# Patient Record
Sex: FEMALE | Race: BLACK OR AFRICAN AMERICAN
[De-identification: names, ages, dates, MRNs, and addresses within clinical notes are randomized per-mention and may not be internally consistent; named-entity substitution may affect disease eponyms.]

---

## 2018-02-21 ENCOUNTER — HOSPITAL ENCOUNTER (EMERGENCY)
Dept: HOSPITAL 62 - ER | Age: 28
Discharge: HOME | End: 2018-02-21
Payer: SELF-PAY

## 2018-02-21 VITALS — SYSTOLIC BLOOD PRESSURE: 131 MMHG | DIASTOLIC BLOOD PRESSURE: 75 MMHG

## 2018-02-21 DIAGNOSIS — F17.200: ICD-10-CM

## 2018-02-21 DIAGNOSIS — R05: ICD-10-CM

## 2018-02-21 DIAGNOSIS — J00: Primary | ICD-10-CM

## 2018-02-21 DIAGNOSIS — H92.03: ICD-10-CM

## 2018-02-21 PROCEDURE — 99282 EMERGENCY DEPT VISIT SF MDM: CPT

## 2018-02-21 NOTE — ER DOCUMENT REPORT
ED ENT





- General


Chief Complaint: Ear Pain


Stated Complaint: EAR PAIN


Time Seen by Provider: 02/21/18 11:15


Mode of Arrival: Ambulatory


Information source: Patient


Notes: 





Patient is a 27-year-old female who presents to the ER today for 1 day of 

bilateral ear pain, worse in the left ear that feels like a pressure, runny 

nose.  Patient admits to dry cough.  She states "I just think it is my 

allergies but my kids have been sick."  Patient was told to get a doctor's note 

by her work this morning whenever she was sent home.  She denies any shortness 

of breath, wheezing, chest pain, fevers or chills.


TRAVEL OUTSIDE OF THE [place] IN LAST 30 DAYS: No





- Related Data


Allergies/Adverse Reactions: 


 





No Known Allergies Allergy (Verified 02/21/18 10:18)


 











Past Medical History





- General


Information source: Patient





- Social History


Smoking Status: Current Every Day Smoker


Chew tobacco use (# tins/day): No


Frequency of alcohol use: None


Drug Abuse: None


Family History: Reviewed & Not Pertinent


Patient has suicidal ideation: No


Patient has homicidal ideation: No


Renal/ Medical History: Denies: Hx Peritoneal Dialysis


Past Surgical History: Denies: Hx Abdominal Surgery





- Immunizations


Hx Diphtheria, Pertussis, Tetanus Vaccination: No - pt reports previously 

received





Review of Systems





- Review of Systems


Constitutional: No symptoms reported


EENT: See HPI


Cardiovascular: No symptoms reported


Respiratory: See HPI


Gastrointestinal: No symptoms reported


Genitourinary: No symptoms reported


Female Genitourinary: No symptoms reported


Musculoskeletal: No symptoms reported


Skin: No symptoms reported


Hematologic/Lymphatic: No symptoms reported


Neurological/Psychological: No symptoms reported





Physical Exam





- Vital signs


Vitals: 





 











Temp Pulse Resp BP Pulse Ox


 


 97.6 F   103 H  16   131/75 H  99 


 


 02/21/18 10:31  02/21/18 10:31  02/21/18 10:31  02/21/18 10:31  02/21/18 10:31














- Notes


Notes: 





PHYSICAL EXAMINATION: 


GENERAL:mildly ill-appearing, but in no acute distress. 


HEAD: Atraumatic, normocephalic. 


EYES: Pupils equal round and reactive to light, extraocular movements intact, 

sclera anicteric, conjunctiva are normal. 


ENT: ear canals without erythema or foreign body, TMs pearly grey with good 

bony landmarks, nares With mucoid discharge, oropharynx clear without exudates. 

Moist mucous membranes. 


NECK: Normal range of motion, supple without lymphadenopathy 


LUNGS: CTAB and equal. No wheezes rales or rhonchi. 


HEART: Regular rate and rhythm without murmurs


ABDOMEN: Soft, no tenderness. No guarding, no rebound 


BACK: no vertebral tenderness, normal ROM


GI/: no CVA tenderness


EXTREMITIES: Normal range of motion, no pitting edema. No cyanosis. 


NEUROLOGICAL: Cranial nerves grossly intact. Normal sensory/motor exams. 


PSYCH: Normal mood, normal affect. 


SKIN: Warm, Dry, normal turgor, no rashes or lesions noted 

















Course





- Vital Signs


Vital signs: 





 











Temp Pulse Resp BP Pulse Ox


 


 97.6 F   103 H  16   131/75 H  99 


 


 02/21/18 10:31  02/21/18 10:31  02/21/18 10:31  02/21/18 10:31  02/21/18 10:31














Discharge





- Discharge


Clinical Impression: 


Upper respiratory infection


Qualifiers:


 URI type: acute nasopharyngitis (common cold) Qualified Code(s): J00 - Acute 

nasopharyngitis [common cold]





Condition: Stable


Disposition: HOME, SELF-CARE


Additional Instructions: 


Return immediately for any new or worsening symptoms.





Follow up with primary care provider, call tomorrow to make followup 

appointment.


Prescriptions: 


Cetirizine HCl [Zyrtec 10 mg Tablet] 1 tab PO DAILY #30 tablet


Forms:  Return to Work

## 2020-01-10 ENCOUNTER — HOSPITAL ENCOUNTER (EMERGENCY)
Dept: HOSPITAL 62 - ER | Age: 30
Discharge: LEFT BEFORE DISCHARGE | End: 2020-01-10
Payer: SELF-PAY

## 2020-01-10 VITALS — SYSTOLIC BLOOD PRESSURE: 120 MMHG | DIASTOLIC BLOOD PRESSURE: 71 MMHG

## 2020-01-10 DIAGNOSIS — Z53.21: Primary | ICD-10-CM

## 2020-01-10 NOTE — ER DOCUMENT REPORT
Doctor's Note


Notes: 





01/10/20 06:38


I signed up to see this patient.  She had been in the department, in her room, 

awaiting time with a provider.  I went in at 610, [phone], and [phone].  Patient was not

present in the department.  Bathrooms were checked.  I suspect she eloped.  I 

did not interview nor evaluate this patient.

## 2020-07-08 ENCOUNTER — HOSPITAL ENCOUNTER (EMERGENCY)
Dept: HOSPITAL 62 - ER | Age: 30
Discharge: HOME | End: 2020-07-08
Payer: MEDICAID

## 2020-07-08 VITALS — SYSTOLIC BLOOD PRESSURE: 101 MMHG | DIASTOLIC BLOOD PRESSURE: 58 MMHG

## 2020-07-08 DIAGNOSIS — F17.200: ICD-10-CM

## 2020-07-08 DIAGNOSIS — S93.491A: Primary | ICD-10-CM
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
## 2020-07-08 PROCEDURE — 99283 EMERGENCY DEPT VISIT LOW MDM: CPT

## 2020-07-08 NOTE — ER DOCUMENT REPORT
ED General





- General


Chief Complaint: Ankle Injury


Stated Complaint: ANKLE INJURY


Time Seen by Provider: 07/08/20 07:50


Notes: 





Presents with right ankle injury yesterday after twisting ankle.  Pain is 

lateral.  Unable to walk.  No knee pain.  Had a pop.  No numbness or tingling.  

Moderate swelling.


TRAVEL OUTSIDE OF THE [place] IN LAST 30 DAYS: No





- Related Data


Allergies/Adverse Reactions: 


                                        





No Known Allergies Allergy (Verified 07/08/20 07:52)


   











Past Medical History





- General


Information source: Patient





- Social History


Smoking Status: Current Every Day Smoker


Chew tobacco use (# tins/day): No


Frequency of alcohol use: None


Drug Abuse: None


Family History: Reviewed & Not Pertinent


Patient has homicidal ideation: No


Renal/ Medical History: Denies: Hx Peritoneal Dialysis


Past Surgical History: Denies: Hx Abdominal Surgery





- Immunizations


Hx Diphtheria, Pertussis, Tetanus Vaccination: No - pt reports previously 

received





Review of Systems





- Review of Systems


Notes: 





REVIEW OF SYSTEMS





GEN: Denies fever, chills, weight loss


ENT: Denies sore throat, nasal discharge, ear pain


EYES: Denies blurry vision, eye pain, discharge


CV: Denies chest pain, palpitations, edema


RESP: Denies cough, shortness of breath, wheezing


GI: Denies abdominal pain, nausea, vomiting, diarrhea


MSK: Right ankle pain


SKIN: Denies rash, skin lesions


LYMPH: Denies swollen glands/lymph nodes


NEURO: Denies headache, focal weakness or numbness, dizziness


PSYCH: Denies depression, suicidal or homicidal ideation








PHYSICAL EXAMINATION





General: No acute distress, well-nourished


Head: Atraumatic, normocephalic


ENT: Mouth normal, oropharynx moist, no exudates or tonsillar enlargement


Eyes: Conjunctiva normal, pupils equal, lids normal


Neck: No JVD, supple, no guarding


CVS: Normal rate, regular rhythm, no murmurs


Resp: No resp distress, equal and normal breath sounds bilaterally


GI: Nondistended, soft, no tenderness to palpation, no rebound or guarding


Ext: Right ankle swelling tenderness on the lateral area.  No knee, fibular head

thigh or calf swelling.


Back: No CVA or midline TTP


Skin: No rash, warm


Lymphatic: No lymphadeopathy noted


Neuro: Awake, alert.  Face symmetric.  GCS 15.





Physical Exam





- Vital signs


Vitals: 


                                        











Temp Pulse Resp BP Pulse Ox


 


 99.0 F   91   18   101/58 L  97 


 


 07/08/20 07:37  07/08/20 07:37  07/08/20 07:37  07/08/20 07:37  07/08/20 07:37














Course





- Vital Signs


Vital signs: 


                                        











Temp Pulse Resp BP Pulse Ox


 


 99.0 F   91   18   101/58 L  97 


 


 07/08/20 07:48  07/08/20 07:37  07/08/20 07:37  07/08/20 07:37  07/08/20 07:37














Discharge





- Discharge


Clinical Impression: 


Right ankle sprain


Qualifiers:


 Encounter type: initial encounter Involved ligament of ankle: other ligament 

Qualified Code(s): S93.491A - Sprain of other ligament of right ankle, initial 

encounter





Condition: Good


Disposition: HOME, SELF-CARE


Instructions:  Ankle Stirrup Splint ([facility]), Ice & Elevation ([facility]), Ice Packs 

([facility]), Soft Ankle Splint ([facility])


Forms:  Return to Work

## 2021-03-11 NOTE — RADIOLOGY REPORT (SQ)
EXAM DESCRIPTION:  ANKLE RIGHT COMPLETE



IMAGES COMPLETED DATE/TIME:  7/8/2020 8:12 am



REASON FOR STUDY:  fall



COMPARISON:  None.



NUMBER OF VIEWS:  Three views.



TECHNIQUE:  AP, lateral, and oblique radiographic images acquired of the right ankle.



LIMITATIONS:  None.



FINDINGS:  MINERALIZATION: Normal.

BONES: No acute fracture or dislocation.  No worrisome bone lesions.

JOINTS: No dislocation.  small joint effusion.

SOFT TISSUES: No soft tissue swelling. No foreign body.

OTHER: No other significant finding.



IMPRESSION:  No evidence of acute bony abnormality of the right ankle.

Small joint effusion.



TECHNICAL DOCUMENTATION:  JOB ID:  [id number]

 2011 [employer]- All Rights Reserved



Reading location - IP/workstation name: [email or web address]